# Patient Record
Sex: FEMALE | Race: WHITE | NOT HISPANIC OR LATINO | Employment: PART TIME | URBAN - METROPOLITAN AREA
[De-identification: names, ages, dates, MRNs, and addresses within clinical notes are randomized per-mention and may not be internally consistent; named-entity substitution may affect disease eponyms.]

---

## 2024-11-13 ENCOUNTER — APPOINTMENT (EMERGENCY)
Dept: CT IMAGING | Facility: HOSPITAL | Age: 19
End: 2024-11-13

## 2024-11-13 ENCOUNTER — HOSPITAL ENCOUNTER (EMERGENCY)
Facility: HOSPITAL | Age: 19
Discharge: HOME | End: 2024-11-13
Attending: EMERGENCY MEDICINE

## 2024-11-13 ENCOUNTER — APPOINTMENT (EMERGENCY)
Dept: RADIOLOGY | Facility: HOSPITAL | Age: 19
End: 2024-11-13

## 2024-11-13 VITALS
OXYGEN SATURATION: 97 % | HEIGHT: 65 IN | DIASTOLIC BLOOD PRESSURE: 60 MMHG | RESPIRATION RATE: 20 BRPM | HEART RATE: 84 BPM | BODY MASS INDEX: 41.65 KG/M2 | SYSTOLIC BLOOD PRESSURE: 128 MMHG | TEMPERATURE: 97.8 F | WEIGHT: 250 LBS

## 2024-11-13 DIAGNOSIS — R07.89 RIGHT-SIDED CHEST WALL PAIN: Primary | ICD-10-CM

## 2024-11-13 DIAGNOSIS — R10.13 EPIGASTRIC PAIN: ICD-10-CM

## 2024-11-13 DIAGNOSIS — D64.9 ANEMIA: ICD-10-CM

## 2024-11-13 DIAGNOSIS — R11.0 NAUSEA: ICD-10-CM

## 2024-11-13 LAB
ALBUMIN SERPL BCG-MCNC: 4 G/DL (ref 3.5–5)
ALP SERPL-CCNC: 88 U/L (ref 34–104)
ALT SERPL W P-5'-P-CCNC: 27 U/L (ref 7–52)
ANION GAP SERPL CALCULATED.3IONS-SCNC: 8 MMOL/L (ref 4–13)
AST SERPL W P-5'-P-CCNC: 19 U/L (ref 13–39)
ATRIAL RATE: 98 BPM
BASOPHILS # BLD AUTO: 0.05 THOUSANDS/ÂΜL (ref 0–0.1)
BASOPHILS NFR BLD AUTO: 0 % (ref 0–1)
BILIRUB SERPL-MCNC: 0.34 MG/DL (ref 0.2–1)
BUN SERPL-MCNC: 9 MG/DL (ref 5–25)
CALCIUM SERPL-MCNC: 9.5 MG/DL (ref 8.4–10.2)
CARDIAC TROPONIN I PNL SERPL HS: <2 NG/L (ref ?–50)
CHLORIDE SERPL-SCNC: 105 MMOL/L (ref 96–108)
CO2 SERPL-SCNC: 25 MMOL/L (ref 21–32)
CREAT SERPL-MCNC: 0.67 MG/DL (ref 0.6–1.3)
EOSINOPHIL # BLD AUTO: 0.05 THOUSAND/ÂΜL (ref 0–0.61)
EOSINOPHIL NFR BLD AUTO: 0 % (ref 0–6)
ERYTHROCYTE [DISTWIDTH] IN BLOOD BY AUTOMATED COUNT: 15.3 % (ref 11.6–15.1)
FLUAV AG UPPER RESP QL IA.RAPID: NEGATIVE
FLUBV AG UPPER RESP QL IA.RAPID: NEGATIVE
GFR SERPL CREATININE-BSD FRML MDRD: 126 ML/MIN/1.73SQ M
GLUCOSE SERPL-MCNC: 87 MG/DL (ref 65–140)
HCG SERPL QL: NEGATIVE
HCT VFR BLD AUTO: 37.2 % (ref 34.8–46.1)
HGB BLD-MCNC: 11 G/DL (ref 11.5–15.4)
IMM GRANULOCYTES # BLD AUTO: 0.05 THOUSAND/UL (ref 0–0.2)
IMM GRANULOCYTES NFR BLD AUTO: 0 % (ref 0–2)
LIPASE SERPL-CCNC: 10 U/L (ref 11–82)
LYMPHOCYTES # BLD AUTO: 2.95 THOUSANDS/ÂΜL (ref 0.6–4.47)
LYMPHOCYTES NFR BLD AUTO: 26 % (ref 14–44)
MCH RBC QN AUTO: 23.3 PG (ref 26.8–34.3)
MCHC RBC AUTO-ENTMCNC: 29.6 G/DL (ref 31.4–37.4)
MCV RBC AUTO: 79 FL (ref 82–98)
MONOCYTES # BLD AUTO: 0.7 THOUSAND/ÂΜL (ref 0.17–1.22)
MONOCYTES NFR BLD AUTO: 6 % (ref 4–12)
NEUTROPHILS # BLD AUTO: 7.45 THOUSANDS/ÂΜL (ref 1.85–7.62)
NEUTS SEG NFR BLD AUTO: 68 % (ref 43–75)
NRBC BLD AUTO-RTO: 0 /100 WBCS
P AXIS: 44 DEGREES
PLATELET # BLD AUTO: 440 THOUSANDS/UL (ref 149–390)
PMV BLD AUTO: 10.3 FL (ref 8.9–12.7)
POTASSIUM SERPL-SCNC: 4 MMOL/L (ref 3.5–5.3)
PR INTERVAL: 118 MS
PROT SERPL-MCNC: 8.1 G/DL (ref 6.4–8.4)
QRS AXIS: 31 DEGREES
QRSD INTERVAL: 90 MS
QT INTERVAL: 350 MS
QTC INTERVAL: 446 MS
RBC # BLD AUTO: 4.73 MILLION/UL (ref 3.81–5.12)
SARS-COV+SARS-COV-2 AG RESP QL IA.RAPID: NEGATIVE
SODIUM SERPL-SCNC: 138 MMOL/L (ref 135–147)
T WAVE AXIS: 21 DEGREES
VENTRICULAR RATE: 98 BPM
WBC # BLD AUTO: 11.25 THOUSAND/UL (ref 4.31–10.16)

## 2024-11-13 PROCEDURE — 71046 X-RAY EXAM CHEST 2 VIEWS: CPT

## 2024-11-13 PROCEDURE — 93010 ELECTROCARDIOGRAM REPORT: CPT | Performed by: INTERNAL MEDICINE

## 2024-11-13 PROCEDURE — 80053 COMPREHEN METABOLIC PANEL: CPT | Performed by: EMERGENCY MEDICINE

## 2024-11-13 PROCEDURE — 74177 CT ABD & PELVIS W/CONTRAST: CPT

## 2024-11-13 PROCEDURE — 93005 ELECTROCARDIOGRAM TRACING: CPT

## 2024-11-13 PROCEDURE — 85025 COMPLETE CBC W/AUTO DIFF WBC: CPT | Performed by: EMERGENCY MEDICINE

## 2024-11-13 PROCEDURE — 99285 EMERGENCY DEPT VISIT HI MDM: CPT | Performed by: EMERGENCY MEDICINE

## 2024-11-13 PROCEDURE — 83690 ASSAY OF LIPASE: CPT | Performed by: EMERGENCY MEDICINE

## 2024-11-13 PROCEDURE — 96374 THER/PROPH/DIAG INJ IV PUSH: CPT

## 2024-11-13 PROCEDURE — 96361 HYDRATE IV INFUSION ADD-ON: CPT

## 2024-11-13 PROCEDURE — 99285 EMERGENCY DEPT VISIT HI MDM: CPT

## 2024-11-13 PROCEDURE — 36415 COLL VENOUS BLD VENIPUNCTURE: CPT | Performed by: EMERGENCY MEDICINE

## 2024-11-13 PROCEDURE — 87804 INFLUENZA ASSAY W/OPTIC: CPT | Performed by: EMERGENCY MEDICINE

## 2024-11-13 PROCEDURE — 87811 SARS-COV-2 COVID19 W/OPTIC: CPT | Performed by: EMERGENCY MEDICINE

## 2024-11-13 PROCEDURE — 84703 CHORIONIC GONADOTROPIN ASSAY: CPT | Performed by: EMERGENCY MEDICINE

## 2024-11-13 PROCEDURE — 96375 TX/PRO/DX INJ NEW DRUG ADDON: CPT

## 2024-11-13 PROCEDURE — 84484 ASSAY OF TROPONIN QUANT: CPT | Performed by: EMERGENCY MEDICINE

## 2024-11-13 RX ORDER — PANTOPRAZOLE SODIUM 20 MG/1
20 TABLET, DELAYED RELEASE ORAL DAILY
Qty: 20 TABLET | Refills: 0 | Status: SHIPPED | OUTPATIENT
Start: 2024-11-13

## 2024-11-13 RX ORDER — ONDANSETRON 2 MG/ML
4 INJECTION INTRAMUSCULAR; INTRAVENOUS ONCE
Status: COMPLETED | OUTPATIENT
Start: 2024-11-13 | End: 2024-11-13

## 2024-11-13 RX ORDER — ACETAMINOPHEN 10 MG/ML
1000 INJECTION, SOLUTION INTRAVENOUS ONCE
Status: COMPLETED | OUTPATIENT
Start: 2024-11-13 | End: 2024-11-13

## 2024-11-13 RX ORDER — ONDANSETRON 4 MG/1
4 TABLET, FILM COATED ORAL EVERY 8 HOURS PRN
Qty: 20 TABLET | Refills: 0 | Status: SHIPPED | OUTPATIENT
Start: 2024-11-13

## 2024-11-13 RX ADMIN — ONDANSETRON 4 MG: 2 INJECTION INTRAMUSCULAR; INTRAVENOUS at 15:32

## 2024-11-13 RX ADMIN — IOHEXOL 100 ML: 350 INJECTION, SOLUTION INTRAVENOUS at 16:56

## 2024-11-13 RX ADMIN — SODIUM CHLORIDE 1000 ML: 0.9 INJECTION, SOLUTION INTRAVENOUS at 15:32

## 2024-11-13 RX ADMIN — ACETAMINOPHEN 1000 MG: 1000 INJECTION, SOLUTION INTRAVENOUS at 15:32

## 2024-11-13 NOTE — ED PROVIDER NOTES
Time reflects when diagnosis was documented in both MDM as applicable and the Disposition within this note       Time User Action Codes Description Comment    11/13/2024  5:47 PM Jose, Bilal Add [R07.89] Right-sided chest wall pain     11/13/2024  5:47 PM Jose, Bilal Add [R11.0] Nausea     11/13/2024  5:47 PM Jose, Bilal Add [R10.13] Epigastric pain     11/13/2024  5:47 PM Jose, Bilal Add [D64.9] Anemia           ED Disposition       ED Disposition   Discharge    Condition   Stable    Date/Time   Wed Nov 13, 2024  5:47 PM    Comment   Wendy Iyer discharge to home/self care.                   Assessment & Plan       Medical Decision Making  Differential diagnosis considered includes but not limited to electrolyte abnormalities, viral syndrome, muscular strain, pneumonia, gastritis/GERD, pancreatitis.  Very low suspicion for ACS.  Meets PE rule out criteria.    Amount and/or Complexity of Data Reviewed  Labs: ordered.  Radiology: ordered.    Risk  Prescription drug management.      Per my independent interpretation of EKG normal sinus rhythm heart of 98, narrow QRS, normal axis, intervals reassuring, no STEMI.    Patient with mild anemia.  Otherwise blood work is reassuring.  Troponin is negative.  Doubt ACS.  CT scan chest x-ray results as below.  Likely has gastritis/GERD as well as musculoskeletal chest wall pain as it is reproducible.  Patient updated all results.  She is feeling better upon reevaluation.  Provided prescriptions for Protonix, Zofran and naproxen.  Advise establishing a PCP and GI follow-up as well.  Return precautions were discussed.         Medications   sodium chloride 0.9 % bolus 1,000 mL (0 mL Intravenous Stopped 11/13/24 1632)   acetaminophen (Ofirmev) injection 1,000 mg (0 mg Intravenous Stopped 11/13/24 1547)   ondansetron (ZOFRAN) injection 4 mg (4 mg Intravenous Given 11/13/24 1532)   iohexol (OMNIPAQUE) 350 MG/ML injection (MULTI-DOSE) 100 mL (100 mL Intravenous Given 11/13/24  "4016)       ED Risk Strat Scores   HEART Risk Score      Flowsheet Row Most Recent Value   Heart Score Risk Calculator    History 0 Filed at: 11/13/2024 2354   ECG 0 Filed at: 11/13/2024 2354   Age 0 Filed at: 11/13/2024 2354   Risk Factors 1 Filed at: 11/13/2024 2354   Troponin 0 Filed at: 11/13/2024 2354   HEART Score 1 Filed at: 11/13/2024 2354                 CRAFFT      Flowsheet Row Most Recent Value   CRAFFT Initial Screen: During the past 12 months, did you:    1. Drink any alcohol (more than a few sips)?  No Filed at: 11/13/2024 1426   2. Smoke any marijuana or hashish No Filed at: 11/13/2024 1426   3. Use anything else to get high? (\"anything else\" includes illegal drugs, over the counter and prescription drugs, and things that you sniff or 'kaiser')? No Filed at: 11/13/2024 1426                  PERC Rule for PE      Flowsheet Row Most Recent Value   PERC Rule for PE    Age >=50 0 Filed at: 11/13/2024 1444   HR >=100 0 Filed at: 11/13/2024 1444   O2 Sat on room air < 95% 0 Filed at: 11/13/2024 1444   History of PE or DVT 0 Filed at: 11/13/2024 1444   Recent trauma or surgery 0 Filed at: 11/13/2024 1444   Hemoptysis 0 Filed at: 11/13/2024 1444   Exogenous estrogen 0 Filed at: 11/13/2024 1444   Unilateral leg swelling 0 Filed at: 11/13/2024 1444   PERC Rule for PE Results 0 Filed at: 11/13/2024 1444                Wells' Criteria for PE      Flowsheet Row Most Recent Value   Wells' Criteria for PE    Clinical signs and symptoms of DVT 0 Filed at: 11/13/2024 1443   PE is primary diagnosis or equally likely 0 Filed at: 11/13/2024 1443   HR >100 0 Filed at: 11/13/2024 1443   Immobilization at least 3 days or Surgery in the previous 4 weeks 0 Filed at: 11/13/2024 1443   Previous, objectively diagnosed PE or DVT 0 Filed at: 11/13/2024 1443   Hemoptysis 0 Filed at: 11/13/2024 1443   Malignancy with treatment within 6 months or palliative 0 Filed at: 11/13/2024 1443   Wells' Criteria Total 0 Filed at: 11/13/2024 " 1443                        History of Present Illness       Chief Complaint   Patient presents with    Abdominal Pain     Abdominal & chest pain with vomiting x 1 week with intermittent difficulty breathing        History reviewed. No pertinent past medical history.   History reviewed. No pertinent surgical history.   History reviewed. No pertinent family history.   Social History     Tobacco Use    Smoking status: Never    Smokeless tobacco: Never   Substance Use Topics    Alcohol use: Never    Drug use: Never      E-Cigarette/Vaping      E-Cigarette/Vaping Substances    Nicotine Yes       I have reviewed and agree with the history as documented.     19-year-old female presents emergency department for right-sided chest pain that is worse with deep inspiration and movement and with coughing as well as upper abdominal discomfort and nausea.  She states it has been ongoing for less than a week.  Did mention that she had a cold about 2 weeks ago and has a dry cough still but it is getting better.  No fevers or chills.  Chest pain is not exertional.  No recent traveling or surgeries.  Mentions a history of asthma however denies any wheezing recently.  No diarrhea, states however when she tries to eat she does vomit.  No history of VTE.  No hemoptysis.  No diaphoresis.  Denies any hormonal medications or OCPs.        Review of Systems   Constitutional:  Negative for chills and fever.   Respiratory:  Positive for cough. Negative for shortness of breath.    Cardiovascular:  Positive for chest pain.   Gastrointestinal:  Positive for abdominal pain, nausea and vomiting. Negative for diarrhea.   Genitourinary:  Negative for dysuria and hematuria.           Objective       ED Triage Vitals [11/13/24 1301]   Temperature Pulse Blood Pressure Respirations SpO2 Patient Position - Orthostatic VS   97.8 °F (36.6 °C) 95 140/82 20 99 % Sitting      Temp Source Heart Rate Source BP Location FiO2 (%) Pain Score    Tympanic Monitor  Left arm -- --      Vitals      Date and Time Temp Pulse SpO2 Resp BP Pain Score FACES Pain Rating User   11/13/24 1600 -- 84 97 % 20 128/60 -- -- [place]   11/13/24 1301 97.8 °F (36.6 °C) 95 99 % 20 140/82 -- -- [place]            Physical Exam  Constitutional:       General: She is not in acute distress.     Appearance: She is not ill-appearing.   HENT:      Head: Normocephalic and atraumatic.      Nose: Nose normal.      Mouth/Throat:      Mouth: Mucous membranes are moist.   Eyes:      Extraocular Movements: Extraocular movements intact.      Pupils: Pupils are equal, round, and reactive to light.   Cardiovascular:      Rate and Rhythm: Normal rate and regular rhythm.   Pulmonary:      Effort: Pulmonary effort is normal. No respiratory distress.      Breath sounds: Normal breath sounds. No wheezing.      Comments: Right-sided chest wall tenderness to palpation  Abdominal:      General: There is no distension.      Palpations: Abdomen is soft.      Tenderness: There is no abdominal tenderness. There is no guarding or rebound.   Musculoskeletal:         General: No swelling or deformity. Normal range of motion.      Cervical back: Normal range of motion and neck supple.   Skin:     General: Skin is warm.      Findings: No erythema.   Neurological:      Mental Status: She is alert and oriented to person, place, and time. Mental status is at baseline.         Results Reviewed       Procedure Component Value Units Date/Time    hCG, qualitative pregnancy [[id number]]  (Normal) Collected: 11/13/24 1532    Lab Status: Final result Specimen: Blood from Arm, Left Updated: 11/13/24 1638     Preg, Serum Negative    HS Troponin 0hr (reflex protocol) [[id number]]  (Normal) Collected: 11/13/24 1532    Lab Status: Final result Specimen: Blood from Arm, Left Updated: 11/13/24 1604     hs TnI 0hr <2 ng/L     Comprehensive metabolic panel [[id number]] Collected: 11/13/24 1532    Lab Status: Final result Specimen: Blood from Arm, Left Updated:  11/13/24 1558     Sodium 138 mmol/L      Potassium 4.0 mmol/L      Chloride 105 mmol/L      CO2 25 mmol/L      ANION GAP 8 mmol/L      BUN 9 mg/dL      Creatinine 0.67 mg/dL      Glucose 87 mg/dL      Calcium 9.5 mg/dL      AST 19 U/L      ALT 27 U/L      Alkaline Phosphatase 88 U/L      Total Protein 8.1 g/dL      Albumin 4.0 g/dL      Total Bilirubin 0.34 mg/dL      eGFR 126 ml/min/1.73sq m     Narrative:      National Kidney Disease Foundation guidelines for Chronic Kidney Disease (CKD):     Stage 1 with normal or high GFR (GFR > 90 mL/min/1.73 square meters)    Stage 2 Mild CKD (GFR = 60-89 mL/min/1.73 square meters)    Stage 3A Moderate CKD (GFR = 45-59 mL/min/1.73 square meters)    Stage 3B Moderate CKD (GFR = 30-44 mL/min/1.73 square meters)    Stage 4 Severe CKD (GFR = 15-29 mL/min/1.73 square meters)    Stage 5 End Stage CKD (GFR <15 mL/min/1.73 square meters)  Note: GFR calculation is accurate only with a steady state creatinine    Lipase [[id number]]  (Abnormal) Collected: 11/13/24 1532    Lab Status: Final result Specimen: Blood from Arm, Left Updated: 11/13/24 1558     Lipase 10 u/L     FLU/COVID Rapid Antigen (30 min. TAT) - Preferred screening test in ED [[id number]]  (Normal) Collected: 11/13/24 1532    Lab Status: Final result Specimen: Nares from Nose Updated: 11/13/24 1555     SARS COV Rapid Antigen Negative     Influenza A Rapid Antigen Negative     Influenza B Rapid Antigen Negative    Narrative:      This test has been performed using the Quidel Gretchen 2 FLU+SARS Antigen test under the Emergency Use Authorization (EUA). This test has been validated by the  and verified by the performing laboratory. The Gretchen uses lateral flow immunofluorescent sandwich assay to detect SARS-COV, Influenza A and Influenza B Antigen.     The Quidel Gretchen 2 SARS Antigen test does not differentiate between SARS-CoV and SARS-CoV-2.     Negative results are presumptive and may be confirmed with a molecular  assay, if necessary, for patient management. Negative results do not rule out SARS-CoV-2 or influenza infection and should not be used as the sole basis for treatment or patient management decisions. A negative test result may occur if the level of antigen in a sample is below the limit of detection of this test.     Positive results are indicative of the presence of viral antigens, but do not rule out bacterial infection or co-infection with other viruses.     All test results should be used as an adjunct to clinical observations and other information available to the provider.    FOR PEDIATRIC PATIENTS - copy/paste COVID Guidelines URL to browser: [email or web address]    CBC and differential [[id number]]  (Abnormal) Collected: 11/13/24 1532    Lab Status: Final result Specimen: Blood from Arm, Left Updated: 11/13/24 1542     WBC 11.25 Thousand/uL      RBC 4.73 Million/uL      Hemoglobin 11.0 g/dL      Hematocrit 37.2 %      MCV 79 fL      MCH 23.3 pg      MCHC 29.6 g/dL      RDW 15.3 %      MPV 10.3 fL      Platelets 440 Thousands/uL      nRBC 0 /100 WBCs      Segmented % 68 %      Immature Grans % 0 %      Lymphocytes % 26 %      Monocytes % 6 %      Eosinophils Relative 0 %      Basophils Relative 0 %      Absolute Neutrophils 7.45 Thousands/µL      Absolute Immature Grans 0.05 Thousand/uL      Absolute Lymphocytes 2.95 Thousands/µL      Absolute Monocytes 0.70 Thousand/µL      Eosinophils Absolute 0.05 Thousand/µL      Basophils Absolute 0.05 Thousands/µL             CT abdomen pelvis with contrast   Final Interpretation by Ulysses Santiago MD (11/13 1414)      No evidence for bowel obstruction. Normal caliber appendix.      Mild sigmoid diverticulosis without evidence for acute diverticulitis.         Workstation performed: [id number]         XR chest 2 views   Final Interpretation by Moshe Ward MD (11/13 4408)      No acute cardiopulmonary disease.             Workstation performed: [id number]             Procedures    ED Medication and Procedure Management   None     Discharge Medication List as of 11/13/2024  5:49 PM        START taking these medications    Details   naproxen (NAPROSYN) 375 mg tablet Take 1 tablet (375 mg total) by mouth 2 (two) times a day with meals, Starting Wed 11/13/2024, Normal      ondansetron (ZOFRAN) 4 mg tablet Take 1 tablet (4 mg total) by mouth every 8 (eight) hours as needed for nausea or vomiting, Starting Wed 11/13/2024, Normal      pantoprazole (PROTONIX) 20 mg tablet Take 1 tablet (20 mg total) by mouth daily, Starting Wed 11/13/2024, Normal             ED SEPSIS DOCUMENTATION   Time reflects when diagnosis was documented in both MDM as applicable and the Disposition within this note       Time User Action Codes Description Comment    11/13/2024  5:47 PM Jose, Bilal Add [R07.89] Right-sided chest wall pain     11/13/2024  5:47 PM Jose, Bilal Add [R11.0] Nausea     11/13/2024  5:47 PM Jose, Bilal Add [R10.13] Epigastric pain     11/13/2024  5:47 PM Jose, Bilal Add [D64.9] Anemia                  Bilal A Jose, DO  11/13/24 8801

## 2024-11-21 ENCOUNTER — TELEPHONE (OUTPATIENT)
Dept: FAMILY MEDICINE CLINIC | Facility: CLINIC | Age: 19
End: 2024-11-21

## 2024-11-21 NOTE — TELEPHONE ENCOUNTER
Called patient to relay message below. Patient did not answer and unable to leave a voicemail.     We are reaching out to all of our patients scheduled for tomorrow to see if you plan to keep your appointment due to the impending weather forecast. Please confirm your intention to keep or cancel by calling us and also know that in the event of a closure we will be calling all patients early tomorrow morning, possibly from an unknow number. Please check with the office before leaving for your appointment. Our number is [phone].

## 2024-11-22 ENCOUNTER — TELEPHONE (OUTPATIENT)
Dept: FAMILY MEDICINE CLINIC | Facility: CLINIC | Age: 19
End: 2024-11-22

## 2024-11-22 NOTE — TELEPHONE ENCOUNTER
Called patient twice to reschedule, no answer. Voicemail is full. No emergency contact. Appointment cancelled.

## 2025-04-06 ENCOUNTER — OFFICE VISIT (OUTPATIENT)
Dept: URGENT CARE | Facility: CLINIC | Age: 20
End: 2025-04-06

## 2025-04-06 VITALS
OXYGEN SATURATION: 96 % | HEART RATE: 84 BPM | DIASTOLIC BLOOD PRESSURE: 76 MMHG | TEMPERATURE: 97.4 F | SYSTOLIC BLOOD PRESSURE: 130 MMHG | RESPIRATION RATE: 16 BRPM

## 2025-04-06 DIAGNOSIS — L51.9 EM (ERYTHEMA MULTIFORME): Primary | ICD-10-CM

## 2025-04-06 PROCEDURE — 99213 OFFICE O/P EST LOW 20 MIN: CPT

## 2025-04-06 RX ORDER — DOXYCYCLINE 100 MG/1
100 TABLET ORAL 2 TIMES DAILY
Qty: 28 TABLET | Refills: 0 | Status: SHIPPED | OUTPATIENT
Start: 2025-04-06 | End: 2025-04-20

## 2025-04-06 NOTE — PROGRESS NOTES
[facility]    NAME: Wendy Iyer is a 19 y.o. female  : 2005    MRN: [id number]  DATE: 2025  TIME: 3:49 PM    Assessment and Plan   EM (erythema multiforme) [L51.9]  1. EM (erythema multiforme)  doxycycline (ADOXA) 100 MG tablet        Patient essentially consistent with Lyme disease bull's-eye rash.  Due to this we will treat with doxycycline.  Given that she is unclear how long it has been there but not test for Lyme as it may not be positive.  Educated that since rash disappeared continue to follow-up.  She has multiple itchy areas this is probably not consistent with Lyme.  Also noted stinging sensation educated that shingles can also appear but no vesicles at this time.  To be consistent with that.  Follow up with primary care in 3-5 days.  Go to ER if symptoms get worse.     Patient Instructions       Go see your regular family doctor in 3-5 days.  Go to emergency room (ER) if you are getting worse.     Chief Complaint     Chief Complaint   Patient presents with    Rash     Vesicular Rash to ankle region or left leg.          History of Present Illness       Today felt stinging sensation to the left ankle.  When she looked down she noticed a potentially bug bite with erythema surrounding the site.  There is no clear tick bite.  She felt that she did feel the areas on her body but no other rash present.  Denies fever chills sick symptoms.        Review of Systems   Review of Systems   Constitutional:  Negative for fever.   Respiratory:  Negative for shortness of breath.    Cardiovascular:  Negative for chest pain.   Skin:  Positive for rash.         Current Medications       Current Outpatient Medications:     doxycycline (ADOXA) 100 MG tablet, Take 1 tablet (100 mg total) by mouth 2 (two) times a day for 14 days, Disp: 28 tablet, Rfl: 0    naproxen (NAPROSYN) 375 mg tablet, Take 1 tablet (375 mg total) by mouth 2 (two) times a day with meals (Patient not taking: Reported on  4/6/2025), Disp: 20 tablet, Rfl: 0    ondansetron (ZOFRAN) 4 mg tablet, Take 1 tablet (4 mg total) by mouth every 8 (eight) hours as needed for nausea or vomiting (Patient not taking: Reported on 4/6/2025), Disp: 20 tablet, Rfl: 0    pantoprazole (PROTONIX) 20 mg tablet, Take 1 tablet (20 mg total) by mouth daily (Patient not taking: Reported on 4/6/2025), Disp: 20 tablet, Rfl: 0    Current Allergies     Allergies as of 04/06/2025 - Reviewed 04/06/2025   Allergen Reaction Noted    Nasal spray Angioedema 11/13/2024            The following portions of the patient's history were reviewed and updated as appropriate: allergies, current medications, past family history, past medical history, past social history, past surgical history and problem list.     No past medical history on file.    No past surgical history on file.    No family history on file.      Medications have been verified.        Objective   /76   Pulse 84   Temp (!) 97.4 °F (36.3 °C)   Resp 16   SpO2 96%        Physical Exam     Physical Exam  Vitals reviewed.   Constitutional:       Appearance: Normal appearance.   Cardiovascular:      Rate and Rhythm: Normal rate and regular rhythm.      Pulses: Normal pulses.      Heart sounds: Normal heart sounds. No murmur heard.  Pulmonary:      Effort: Pulmonary effort is normal. No respiratory distress.      Breath sounds: Normal breath sounds.   Skin:     General: Skin is warm and dry.      Capillary Refill: Capillary refill takes less than 2 seconds.      Comments: Left ankle with circular area of erythema then area of clearing no surrounding erythema to the site.  This is in the standard bull's-eye appearance.   Neurological:      General: No focal deficit present.      Mental Status: She is alert and oriented to person, place, and time.   Psychiatric:         Mood and Affect: Mood normal.         Behavior: Behavior normal.